# Patient Record
Sex: FEMALE | Race: OTHER | URBAN - METROPOLITAN AREA
[De-identification: names, ages, dates, MRNs, and addresses within clinical notes are randomized per-mention and may not be internally consistent; named-entity substitution may affect disease eponyms.]

---
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
## 2024-08-06 NOTE — ED PEDIATRIC NURSE NOTE - NSSUSCREENINGQ2_ED_ALL_ED
PHYSICAL / OCCUPATIONAL THERAPY - DAILY TREATMENT NOTE    Patient Name: Maricruz Babcock    Date: 2024    : 1955  Insurance: Payor: MEDICARE / Plan: MEDICARE PART A AND B / Product Type: *No Product type* /      Patient  verified Yes     Visit #   Current / Total 4 16   Time   In / Out 9:40 10:20   Pain   In / Out 0 0   Subjective Functional Status/Changes: Pt reports the achilles pain is getting less.     TREATMENT AREA =  Left ankle pain [M25.572]     OBJECTIVE    Modalities Rationale:     decrease inflammation, decrease pain, and increase tissue extensibility to improve patient's ability to progress to PLOF and address remaining functional goals.     min [] Estim Unattended, type/location:                                      []  w/ice    []  w/heat    min [] Estim Attended, type/location:                                     []  w/US     []  w/ice    []  w/heat    []  TENS insruct      min []  Mechanical Traction: type/lbs                   []  pro   []  sup   []  int   []  cont    []  before manual    []  after manual   12 min [x]  Ultrasound, settings/location:  Pt supine with bolster on L ankle. 1 Mz 0.9 Hz    min  unbill []  Ice     []  Heat    location/position:     min []  Paraffin,  details:     min []  Vasopneumatic Device, press/temp:     min []  Whirlpool / Fluido:    If using vaso (only need to measure limb vaso being performed on)      pre-treatment girth :       post-treatment girth :       measured at (landmark location) :      min []  Other:    Skin assessment post-treatment:   Intact       Therapeutic Procedures:    Tx Min Billable or 1:1 Min (if diff from Tx Min) Procedure, Rationale, Specifics   15  [id number] Therapeutic Exercise (timed):  increase ROM, strength, coordination, balance, and proprioception to improve patient's ability to progress to PLOF and address remaining functional goals. (see flow sheet as applicable)     Details if applicable:         [address] Self Care/Home 
No

## 2025-03-01 ENCOUNTER — NON-APPOINTMENT (OUTPATIENT)
Age: 20
End: 2025-03-01

## 2025-05-16 NOTE — ED PROVIDER NOTE - NEUROLOGICAL
Physical Medicine and Rehabilitation     This patient was previously evaluated by Dr. Ramirez on 5/13 and deemed a candidate for the Inpatient Rehab Program (IRP). Chart reviewed and discussed with Dr. Ramirez today for continued eligibility. Patient continues to function below her preadmission baseline and has been participating in therapies.    The patient requires and is expected to participate in a minimum of 3 hours of therapy per day at least 5 days per week and is expected to make measurable improvement that will be of practical value to improve her functional capacity. This patient's medical condition is currently stable to allow this patient to actively participate in IRP. This patient's combined medical and rehab needs are such that an IRP physician will conduct face-to-face visits a minimum of 3 days per week during the IRP stay to allow for modifications to this patient's rehab process in order to maximize progress and outcomes.  This patient requires the active and ongoing therapeutic intervention of multiple therapy disciplines.  Due to the complexity of the combined medical and rehab conditions, this patient demonstrates the need for an IRP admission to address their post-acute rehab needs.  Please see the PM&R consult for a complete assessment.    Just received voice message from [employer] that the patient appeal with insurance for authorization of IRP admission has been approved, insurance authorization obtained.  Patient has been medically cleared for IRP admission and has a discharge order in place for same.    Recommendation: Patient continues to meet IRP criteria. IRP bed available today- 5/16. Admit to IRP 5/16.    ELOS: per consult 7-14 days- will be discussed at first IRP team conference.    IRP admitting and attending physician: Dr. Ramirez.     An IRP physician co-signing this note serves as an attestation of a physician review of the patient continuing to meet IRP  criteria and approval of admitting this patient to the [facility] Inpatient Rehab Program.     Henna Hunt RN, Rehab Admissions Coordinator  [phone]   Alert and interactive, no focal deficits

## 2025-06-10 NOTE — ED PROVIDER NOTE - TEMPLATE
What Type Of Note Output Would You Prefer (Optional)?: Bullet Format
Hpi Title: Evaluation of Skin Lesions
OB/GYN